# Patient Record
Sex: FEMALE | Race: WHITE | NOT HISPANIC OR LATINO | URBAN - METROPOLITAN AREA
[De-identification: names, ages, dates, MRNs, and addresses within clinical notes are randomized per-mention and may not be internally consistent; named-entity substitution may affect disease eponyms.]

---

## 2020-01-26 ENCOUNTER — EMERGENCY (EMERGENCY)
Facility: HOSPITAL | Age: 20
LOS: 1 days | Discharge: HOME | End: 2020-01-26
Admitting: EMERGENCY MEDICINE
Payer: COMMERCIAL

## 2020-01-26 VITALS
DIASTOLIC BLOOD PRESSURE: 75 MMHG | HEART RATE: 100 BPM | SYSTOLIC BLOOD PRESSURE: 130 MMHG | HEIGHT: 62 IN | RESPIRATION RATE: 18 BRPM | WEIGHT: 106.92 LBS | TEMPERATURE: 98 F | OXYGEN SATURATION: 97 %

## 2020-01-26 DIAGNOSIS — J02.9 ACUTE PHARYNGITIS, UNSPECIFIED: ICD-10-CM

## 2020-01-26 PROCEDURE — 99283 EMERGENCY DEPT VISIT LOW MDM: CPT

## 2020-01-26 RX ORDER — FLUCONAZOLE 150 MG/1
1 TABLET ORAL
Qty: 1 | Refills: 0
Start: 2020-01-26

## 2020-01-26 RX ADMIN — Medication 1 TABLET(S): at 09:31

## 2020-01-26 NOTE — ED ADULT NURSE NOTE - OBJECTIVE STATEMENT
right side of throat started hurting yesterday, associated right swollen gland and white patch on right tonsil; had a fever at home ; took ibuprofen 1 hour ago;

## 2020-01-26 NOTE — ED ADULT TRIAGE NOTE - CHIEF COMPLAINT QUOTE
patient complains fo right sided throat pain since yesterday with painful swallowing. denies cough, sob. denies drooling or voice change. she states that she felt a fever yesterday and took ibuprofen,. exudate noted to back of throat. no acute distress

## 2020-01-26 NOTE — ED PROVIDER NOTE - ENMT, MLM
pharynx mucosa pink, moist, +right tonsil edema with +exudate.  +right side cervical lymphadenopathy

## 2020-01-26 NOTE — ED PROVIDER NOTE - NSFOLLOWUPINSTRUCTIONS_ED_ALL_ED_FT
Strep Throat    WHAT YOU NEED TO KNOW:    Strep throat is a throat infection caused by bacteria. It is easily spread from person to person.    DISCHARGE INSTRUCTIONS:    Call 911 for any of the following:     You have trouble breathing.        Return to the emergency department if:     You have new symptoms like a bad headache, stiff neck, chest pain, or vomiting.      You are drooling because you cannot swallow your spit.    Contact your healthcare provider if:     You have a fever.      You have a rash or ear pain.      You have green, yellow-brown, or bloody mucus when you cough or blow your nose.      You are unable to drink anything.      You have questions or concerns about your condition or care.    Medicines:     Antibiotics help treat your strep throat. You should feel better within 2 to 3 days after you start antibiotics.      Take your medicine as directed. Contact your healthcare provider if you think your medicine is not helping or if you have side effects. Tell him or her if you are allergic to any medicine. Keep a list of the medicines, vitamins, and herbs you take. Include the amounts, and when and why you take them. Bring the list or the pill bottles to follow-up visits. Carry your medicine list with you in case of an emergency.    Manage your symptoms:     Use lozenges, ice, soft foods, or popsicles to soothe your throat.      Drink juice, milk shakes, or soup if your throat is too sore to eat solid food. Drinking liquids can also help prevent dehydration.      Gargle with salt water. Mix ¼ teaspoon salt in a glass of warm water and gargle. This may help reduce swelling in your throat.      Do not smoke. Nicotine and other chemicals in cigarettes and cigars can cause lung damage and make your symptoms worse. Ask your healthcare provider for information if you currently smoke and need help to quit. E-cigarettes or smokeless tobacco still contain nicotine. Talk to your healthcare provider before you use these products.     Return to work or school 24 hours after you start antibiotic medicine.    Prevent the spread of strep throat:     Wash your hands often. Use soap and water. Wash your hands after you use the bathroom, change a child's diapers, or sneeze. Wash your hands before you prepare or eat food.       Do not share food or drinks. Replace your toothbrush after you have taken antibiotics for 24 hours.    Follow up with your healthcare provider as directed: Write down your questions so you remember to ask them during your visits

## 2020-01-26 NOTE — ED PROVIDER NOTE - PATIENT PORTAL LINK FT
You can access the FollowMyHealth Patient Portal offered by [facility] by registering at the following website: [email or web address]. By joining [facility]’s FollowMyHealth portal, you will also be able to view your health information using other applications (apps) compatible with our system.

## 2020-01-26 NOTE — ED PROVIDER NOTE - OBJECTIVE STATEMENT
20 y/o f presents c/o 2 days of fever, sore throat, swollen gland on right side of neck.  Pt stating she took ibuprofen this morning for her fever with improvement.  Denies difficulty swallowing, cough, all other ROS negative.

## 2020-01-26 NOTE — ED PROVIDER NOTE - CLINICAL SUMMARY MEDICAL DECISION MAKING FREE TEXT BOX
18 y/o f presents c/o sore throat, fever x 2 days; exam consistent with strep pharyngitis, meets 4/4 centor criteria, will treat with course of augmentin, given diflucan for after course of abx.

## 2023-05-17 NOTE — ED ADULT NURSE NOTE - NSIMPLEMENTINTERV_GEN_ALL_ED
[Nutrition/ Exercise/ Weight Management] : nutrition, exercise, weight management [Vitamins/Supplements] : vitamins/supplements [Breast Self Exam] : breast self exam [Confidentiality] : confidentiality [Lab Results] : lab results [Medication Management] : medication management [Pre/Post Op Instructions] : pre/post op instructions Implemented All Universal Safety Interventions:  [place] to call system. Call bell, personal items and telephone within reach. Instruct patient to call for assistance. Room bathroom lighting operational. Non-slip footwear when patient is off stretcher. Physically safe environment: no spills, clutter or unnecessary equipment. Stretcher in lowest position, wheels locked, appropriate side rails in place.